# Patient Record
Sex: FEMALE | Race: OTHER | HISPANIC OR LATINO | URBAN - METROPOLITAN AREA
[De-identification: names, ages, dates, MRNs, and addresses within clinical notes are randomized per-mention and may not be internally consistent; named-entity substitution may affect disease eponyms.]

---

## 2020-12-23 ENCOUNTER — EMERGENCY (EMERGENCY)
Age: 35
LOS: 1 days | Discharge: HOME | End: 2020-12-23
Attending: PEDIATRICS | Admitting: PEDIATRICS

## 2020-12-23 ENCOUNTER — EMERGENCY (EMERGENCY)
Age: 35
LOS: 1 days | Discharge: HOME | End: 2020-12-23
Admitting: EMERGENCY MEDICINE
Payer: COMMERCIAL

## 2020-12-23 VITALS
OXYGEN SATURATION: 100 % | HEART RATE: 84 BPM | TEMPERATURE: 98 F | SYSTOLIC BLOOD PRESSURE: 148 MMHG | RESPIRATION RATE: 18 BRPM | DIASTOLIC BLOOD PRESSURE: 88 MMHG

## 2020-12-23 VITALS
SYSTOLIC BLOOD PRESSURE: 132 MMHG | HEART RATE: 82 BPM | RESPIRATION RATE: 18 BRPM | DIASTOLIC BLOOD PRESSURE: 86 MMHG | OXYGEN SATURATION: 100 % | TEMPERATURE: 98 F

## 2020-12-23 PROCEDURE — 99053 MED SERV 10PM-8AM 24 HR FAC: CPT

## 2020-12-23 PROCEDURE — 99283 EMERGENCY DEPT VISIT LOW MDM: CPT

## 2020-12-23 RX ORDER — ACETAMINOPHEN 500 MG
500 TABLET ORAL ONCE
Refills: 0 | Status: COMPLETED | OUTPATIENT
Start: 2020-12-23 | End: 2020-12-23

## 2020-12-23 RX ADMIN — Medication 500 MILLIGRAM(S): at 11:30
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
## 2020-12-23 NOTE — ED PROVIDER NOTE - PROGRESS NOTE DETAILS
tylenol given for knee pain. RA cleared. will transfer to adult side once child is stable with disposition plan.
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
## 2020-12-23 NOTE — ED PEDIATRIC NURSE REASSESSMENT NOTE - NS ED NURSE REASSESS COMMENT FT2
Spoke with patient, she wants to go up to surgery with her daughter and does not want to be seen in the adult ER at this time. She is aware that she will be leaving the ER against medical advice and made aware of the risks of doing so, and is aware that if she needs to be evaluated later she can always re-register as a patient in the ER.

## 2020-12-24 VITALS
TEMPERATURE: 98 F | RESPIRATION RATE: 16 BRPM | SYSTOLIC BLOOD PRESSURE: 144 MMHG | HEART RATE: 87 BPM | DIASTOLIC BLOOD PRESSURE: 88 MMHG | OXYGEN SATURATION: 99 %

## 2020-12-24 PROBLEM — Z78.9 OTHER SPECIFIED HEALTH STATUS: Chronic | Status: ACTIVE | Noted: 2020-12-23

## 2020-12-24 PROCEDURE — 73564 X-RAY EXAM KNEE 4 OR MORE: CPT | Mod: 26,RT

## 2020-12-24 PROCEDURE — 73521 X-RAY EXAM HIPS BI 2 VIEWS: CPT | Mod: 26

## 2020-12-24 RX ORDER — ACETAMINOPHEN 500 MG
975 TABLET ORAL ONCE
Refills: 0 | Status: COMPLETED | OUTPATIENT
Start: 2020-12-24 | End: 2020-12-24

## 2020-12-24 RX ORDER — LIDOCAINE 4 G/100G
1 CREAM TOPICAL ONCE
Refills: 0 | Status: COMPLETED | OUTPATIENT
Start: 2020-12-24 | End: 2020-12-24

## 2020-12-24 RX ADMIN — LIDOCAINE 1 PATCH: 4 CREAM TOPICAL at 02:48

## 2020-12-24 RX ADMIN — Medication 975 MILLIGRAM(S): at 02:48

## 2020-12-24 NOTE — ED PROVIDER NOTE - PATIENT PORTAL LINK FT
You can access the FollowMyHealth Patient Portal offered by [facility] by registering at the following website: [email or web address]. By joining [employer]’s FollowMyHealth portal, you will also be able to view your health information using other applications (apps) compatible with our system.
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
## 2020-12-24 NOTE — ED PROVIDER NOTE - PROGRESS NOTE DETAILS
MARKO Curry - patient ucg positive - did not initially inform provider but states 6 days ago had a medical  for unwanted pregnancy - admits to mild vaginal bleeding but denies abdominal pain. MARKO Curry - patient feeling much better after tylenol and lidoderm. Xrays negative. Pt. stable for DC at this time.

## 2020-12-24 NOTE — ED PROVIDER NOTE - MUSCULOSKELETAL MINIMAL EXAM
full rom hip - c/o mild pain with passive rom external rotation. R>L. no hip instability.    right knee:   no swelling or obv deformity. full rom without pain.  + ttp medial aspect - slight bruising noted.      left upperthigh/groin: minimal bruising noted. nttp.    full rom bl legs without difficlty. able to straight leg raise without difficulty.  sensations intact.         back: no midline tenderness. + mild paravertebral tenderness. no crepitus, no obv deformity.
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
## 2020-12-24 NOTE — ED PROVIDER NOTE - NSFOLLOWUPINSTRUCTIONS_ED_ALL_ED_FT
REST, NO STRENUOUS ACTIVITY  ICE TO AREAS  TAKE TYLENOL OR MOTRIN FOR PAIN  **FOLLOW UP WITH REGULAR DOCTOR IN 3-5 DAYS**  RETURN TO ER FOR WORSENING SYMPTOMS
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
